# Patient Record
Sex: FEMALE | Race: BLACK OR AFRICAN AMERICAN | URBAN - METROPOLITAN AREA
[De-identification: names, ages, dates, MRNs, and addresses within clinical notes are randomized per-mention and may not be internally consistent; named-entity substitution may affect disease eponyms.]

---

## 2020-05-22 ENCOUNTER — HOSPITAL ENCOUNTER (OUTPATIENT)
Dept: MEDSURG UNIT | Facility: HOSPITAL | Age: 25
End: 2020-05-23
Attending: OBSTETRICS & GYNECOLOGY | Admitting: OBSTETRICS & GYNECOLOGY

## 2020-05-22 LAB
ABS NEUT (OLG): 5.05 X10(3)/MCL (ref 2.1–9.2)
ALBUMIN SERPL-MCNC: 3.1 GM/DL (ref 3.4–5)
ALBUMIN/GLOB SERPL: 0.7 RATIO (ref 1.1–2)
ALP SERPL-CCNC: 73 UNIT/L (ref 45–117)
ALT SERPL-CCNC: 20 UNIT/L (ref 12–78)
ANISOCYTOSIS BLD QL SMEAR: NORMAL
APPEARANCE, UA: ABNORMAL
APTT PPP: 26.6 SECOND(S) (ref 23.3–37)
AST SERPL-CCNC: 17 UNIT/L (ref 15–37)
BACTERIA #/AREA URNS AUTO: ABNORMAL /HPF
BASOPHILS # BLD AUTO: 0 X10(3)/MCL (ref 0–0.2)
BASOPHILS NFR BLD AUTO: 0 %
BILIRUB SERPL-MCNC: 0.1 MG/DL (ref 0.2–1)
BILIRUB UR QL STRIP: NEGATIVE
BILIRUBIN DIRECT+TOT PNL SERPL-MCNC: <0.1 MG/DL (ref 0–0.2)
BILIRUBIN DIRECT+TOT PNL SERPL-MCNC: ABNORMAL MG/DL
BUN SERPL-MCNC: 9 MG/DL (ref 7–18)
CALCIUM SERPL-MCNC: 8.5 MG/DL (ref 8.5–10.1)
CHLORIDE SERPL-SCNC: 109 MMOL/L (ref 98–107)
CO2 SERPL-SCNC: 26 MMOL/L (ref 21–32)
COLOR UR: ABNORMAL
CREAT SERPL-MCNC: 0.7 MG/DL (ref 0.6–1.3)
CROSSMATCH INTERPRETATION: NORMAL
CROSSMATCH INTERPRETATION: NORMAL
EOSINOPHIL # BLD AUTO: 0.1 X10(3)/MCL (ref 0–0.9)
EOSINOPHIL NFR BLD AUTO: 1 %
ERYTHROCYTE [DISTWIDTH] IN BLOOD BY AUTOMATED COUNT: 23.2 % (ref 11.5–14.5)
EST. AVERAGE GLUCOSE BLD GHB EST-MCNC: ABNORMAL MG/DL
GLOBULIN SER-MCNC: 4.6 GM/ML (ref 2.3–3.5)
GLUCOSE (UA): NEGATIVE
GLUCOSE SERPL-MCNC: 104 MG/DL (ref 74–106)
HBA1C MFR BLD: <3.5 % (ref 4.2–6.3)
HCT VFR BLD AUTO: 21.9 % (ref 35–46)
HGB BLD-MCNC: 6.2 GM/DL (ref 12–16)
HGB UR QL STRIP: 1 MG/DL
HYALINE CASTS #/AREA URNS LPF: ABNORMAL /LPF
HYPOCHROMIA BLD QL SMEAR: NORMAL
IMM GRANULOCYTES # BLD AUTO: 0.02 10*3/UL
IMM GRANULOCYTES NFR BLD AUTO: 0 %
INR PPP: 1.12 (ref 0.9–1.2)
KETONES UR QL STRIP: ABNORMAL
LEUKOCYTE ESTERASE UR QL STRIP: NEGATIVE
LYMPHOCYTES # BLD AUTO: 1.7 X10(3)/MCL (ref 0.6–4.6)
LYMPHOCYTES NFR BLD AUTO: 23 %
MCH RBC QN AUTO: 20.3 PG (ref 26–34)
MCHC RBC AUTO-ENTMCNC: 28.3 GM/DL (ref 31–37)
MCV RBC AUTO: 71.8 FL (ref 80–100)
MICROCYTES BLD QL SMEAR: NORMAL
MONOCYTES # BLD AUTO: 0.6 X10(3)/MCL (ref 0.1–1.3)
MONOCYTES NFR BLD AUTO: 8 %
NEUTROPHILS # BLD AUTO: 5.05 X10(3)/MCL (ref 2.1–9.2)
NEUTROPHILS NFR BLD AUTO: 68 %
NITRITE UR QL STRIP: NEGATIVE
PH UR STRIP: 5.5 [PH] (ref 4.5–8)
PLATELET # BLD AUTO: 533 X10(3)/MCL (ref 130–400)
PLATELET # BLD EST: NORMAL 10*3/UL
PMV BLD AUTO: 9.6 FL (ref 7.4–10.4)
POC BETA-HCG (QUAL): NEGATIVE
POIKILOCYTOSIS BLD QL SMEAR: NORMAL
POLYCHROMASIA BLD QL SMEAR: NORMAL
POTASSIUM SERPL-SCNC: 3.6 MMOL/L (ref 3.5–5.1)
PRODUCT READY: NORMAL
PRODUCT READY: NORMAL
PROLACTIN LEVEL (OHS): 29.7 NG/ML (ref 1–24)
PROT SERPL-MCNC: 7.7 GM/DL (ref 6.4–8.2)
PROT UR QL STRIP: 50 MG/DL
PROTHROMBIN TIME: 14 SECOND(S) (ref 11.9–14.4)
RBC # BLD AUTO: 3.05 X10(6)/MCL (ref 4–5.2)
RBC #/AREA URNS AUTO: >=100 /HPF
RBC MORPH BLD: NORMAL
SCHISTOCYTES BLD QL AUTO: NORMAL
SODIUM SERPL-SCNC: 139 MMOL/L (ref 136–145)
SP GR UR STRIP: 1.03 (ref 1–1.03)
SQUAMOUS #/AREA URNS LPF: ABNORMAL /LPF
T4 FREE SERPL-MCNC: 0.98 NG/DL (ref 0.76–1.46)
TRANSFUSION ORDER: NORMAL
TRANSFUSION ORDER: NORMAL
TSH SERPL-ACNC: 0.24 MIU/L (ref 0.36–3.74)
UROBILINOGEN UR STRIP-ACNC: NORMAL
WBC # SPEC AUTO: 7.5 X10(3)/MCL (ref 4.5–11)
WBC #/AREA URNS AUTO: ABNORMAL /HPF

## 2020-05-23 LAB
ABS NEUT (OLG): 6.17 X10(3)/MCL (ref 2.1–9.2)
ABS NEUT (OLG): 7.52 X10(3)/MCL (ref 2.1–9.2)
ABS NEUT (OLG): 7.82 X10(3)/MCL (ref 2.1–9.2)
BASOPHILS # BLD AUTO: 0 X10(3)/MCL (ref 0–0.2)
BASOPHILS # BLD AUTO: 0.1 X10(3)/MCL (ref 0–0.2)
BASOPHILS # BLD AUTO: 0.1 X10(3)/MCL (ref 0–0.2)
BASOPHILS NFR BLD AUTO: 0 %
BASOPHILS NFR BLD AUTO: 0 %
BASOPHILS NFR BLD AUTO: 1 %
EOSINOPHIL # BLD AUTO: 0 X10(3)/MCL (ref 0–0.9)
EOSINOPHIL # BLD AUTO: 0.1 X10(3)/MCL (ref 0–0.9)
EOSINOPHIL # BLD AUTO: 0.1 X10(3)/MCL (ref 0–0.9)
EOSINOPHIL NFR BLD AUTO: 0 %
EOSINOPHIL NFR BLD AUTO: 1 %
EOSINOPHIL NFR BLD AUTO: 1 %
ERYTHROCYTE [DISTWIDTH] IN BLOOD BY AUTOMATED COUNT: 20.7 % (ref 11.5–14.5)
ERYTHROCYTE [DISTWIDTH] IN BLOOD BY AUTOMATED COUNT: 21.2 % (ref 11.5–14.5)
ERYTHROCYTE [DISTWIDTH] IN BLOOD BY AUTOMATED COUNT: 21.4 % (ref 11.5–14.5)
HCT VFR BLD AUTO: 23.9 % (ref 35–46)
HCT VFR BLD AUTO: 25.5 % (ref 35–46)
HCT VFR BLD AUTO: 28.7 % (ref 35–46)
HGB BLD-MCNC: 7.1 GM/DL (ref 12–16)
HGB BLD-MCNC: 7.3 GM/DL (ref 12–16)
HGB BLD-MCNC: 8.8 GM/DL (ref 12–16)
IMM GRANULOCYTES # BLD AUTO: 0.02 10*3/UL
IMM GRANULOCYTES # BLD AUTO: 0.04 10*3/UL
IMM GRANULOCYTES # BLD AUTO: 0.04 10*3/UL
IMM GRANULOCYTES NFR BLD AUTO: 0 %
LYMPHOCYTES # BLD AUTO: 1.7 X10(3)/MCL (ref 0.6–4.6)
LYMPHOCYTES # BLD AUTO: 2.6 X10(3)/MCL (ref 0.6–4.6)
LYMPHOCYTES # BLD AUTO: 3.6 X10(3)/MCL (ref 0.6–4.6)
LYMPHOCYTES NFR BLD AUTO: 17 %
LYMPHOCYTES NFR BLD AUTO: 27 %
LYMPHOCYTES NFR BLD AUTO: 29 %
MCH RBC QN AUTO: 21.1 PG (ref 26–34)
MCH RBC QN AUTO: 21.6 PG (ref 26–34)
MCH RBC QN AUTO: 22.3 PG (ref 26–34)
MCHC RBC AUTO-ENTMCNC: 28.6 GM/DL (ref 31–37)
MCHC RBC AUTO-ENTMCNC: 29.7 GM/DL (ref 31–37)
MCHC RBC AUTO-ENTMCNC: 30.7 GM/DL (ref 31–37)
MCV RBC AUTO: 72.7 FL (ref 80–100)
MCV RBC AUTO: 72.9 FL (ref 80–100)
MCV RBC AUTO: 73.7 FL (ref 80–100)
MONOCYTES # BLD AUTO: 0.4 X10(3)/MCL (ref 0.1–1.3)
MONOCYTES # BLD AUTO: 0.6 X10(3)/MCL (ref 0.1–1.3)
MONOCYTES # BLD AUTO: 0.8 X10(3)/MCL (ref 0.1–1.3)
MONOCYTES NFR BLD AUTO: 4 %
MONOCYTES NFR BLD AUTO: 6 %
MONOCYTES NFR BLD AUTO: 7 %
NEUTROPHILS # BLD AUTO: 6.17 X10(3)/MCL (ref 2.1–9.2)
NEUTROPHILS # BLD AUTO: 7.52 X10(3)/MCL (ref 2.1–9.2)
NEUTROPHILS # BLD AUTO: 7.82 X10(3)/MCL (ref 2.1–9.2)
NEUTROPHILS NFR BLD AUTO: 63 %
NEUTROPHILS NFR BLD AUTO: 64 %
NEUTROPHILS NFR BLD AUTO: 77 %
PLATELET # BLD AUTO: 383 X10(3)/MCL (ref 130–400)
PLATELET # BLD AUTO: 421 X10(3)/MCL (ref 130–400)
PLATELET # BLD AUTO: 424 X10(3)/MCL (ref 130–400)
PMV BLD AUTO: 9.5 FL (ref 7.4–10.4)
PMV BLD AUTO: 9.6 FL (ref 7.4–10.4)
PMV BLD AUTO: 9.6 FL (ref 7.4–10.4)
RBC # BLD AUTO: 3.28 X10(6)/MCL (ref 4–5.2)
RBC # BLD AUTO: 3.46 X10(6)/MCL (ref 4–5.2)
RBC # BLD AUTO: 3.95 X10(6)/MCL (ref 4–5.2)
SARS-COV-2 RNA RESP QL NAA+PROBE: NOT DETECTED
WBC # SPEC AUTO: 12.5 X10(3)/MCL (ref 4.5–11)
WBC # SPEC AUTO: 9.6 X10(3)/MCL (ref 4.5–11)
WBC # SPEC AUTO: 9.7 X10(3)/MCL (ref 4.5–11)

## 2020-05-27 ENCOUNTER — HISTORICAL (OUTPATIENT)
Dept: ADMINISTRATIVE | Facility: HOSPITAL | Age: 25
End: 2020-05-27

## 2020-05-27 LAB
ABS NEUT (OLG): 5.86 X10(3)/MCL (ref 2.1–9.2)
ANISOCYTOSIS BLD QL SMEAR: NORMAL
BASOPHILS # BLD AUTO: 0 X10(3)/MCL (ref 0–0.2)
BASOPHILS NFR BLD AUTO: 0 %
DACRYOCYTES BLD QL SMEAR: NORMAL
EOSINOPHIL # BLD AUTO: 0.1 X10(3)/MCL (ref 0–0.9)
EOSINOPHIL NFR BLD AUTO: 1 %
ERYTHROCYTE [DISTWIDTH] IN BLOOD BY AUTOMATED COUNT: 22.7 % (ref 11.5–14.5)
HCT VFR BLD AUTO: 23.7 % (ref 35–46)
HGB BLD-MCNC: 6.8 GM/DL (ref 12–16)
HYPOCHROMIA BLD QL SMEAR: NORMAL
IMM GRANULOCYTES # BLD AUTO: 0.03 10*3/UL
IMM GRANULOCYTES NFR BLD AUTO: 0 %
LYMPHOCYTES # BLD AUTO: 2.1 X10(3)/MCL (ref 0.6–4.6)
LYMPHOCYTES NFR BLD AUTO: 24 %
MCH RBC QN AUTO: 22.2 PG (ref 26–34)
MCHC RBC AUTO-ENTMCNC: 28.7 GM/DL (ref 31–37)
MCV RBC AUTO: 77.5 FL (ref 80–100)
MICROCYTES BLD QL SMEAR: NORMAL
MONOCYTES # BLD AUTO: 0.4 X10(3)/MCL (ref 0.1–1.3)
MONOCYTES NFR BLD AUTO: 5 %
NEUTROPHILS # BLD AUTO: 5.86 X10(3)/MCL (ref 2.1–9.2)
NEUTROPHILS NFR BLD AUTO: 68 %
OVALOCYTES BLD QL SMEAR: NORMAL
PLATELET # BLD AUTO: 398 X10(3)/MCL (ref 130–400)
PLATELET # BLD EST: ADEQUATE 10*3/UL
PMV BLD AUTO: 9.6 FL (ref 7.4–10.4)
POIKILOCYTOSIS BLD QL SMEAR: NORMAL
POLYCHROMASIA BLD QL SMEAR: NORMAL
PROLACTIN LEVEL (OHS): 18.3 NG/ML (ref 1–24)
RBC # BLD AUTO: 3.06 X10(6)/MCL (ref 4–5.2)
RBC MORPH BLD: NORMAL
SCHISTOCYTES BLD QL AUTO: NORMAL
T3FREE SERPL-MCNC: 2.48 PG/ML (ref 2.18–3.98)
T4 FREE SERPL-MCNC: 1.07 NG/DL (ref 0.76–1.46)
TSH SERPL-ACNC: 0.78 MIU/L (ref 0.36–3.74)
WBC # SPEC AUTO: 8.5 X10(3)/MCL (ref 4.5–11)

## 2022-04-07 ENCOUNTER — HISTORICAL (OUTPATIENT)
Dept: ADMINISTRATIVE | Facility: HOSPITAL | Age: 27
End: 2022-04-07

## 2022-04-23 VITALS
SYSTOLIC BLOOD PRESSURE: 125 MMHG | HEIGHT: 65 IN | WEIGHT: 213.19 LBS | DIASTOLIC BLOOD PRESSURE: 73 MMHG | OXYGEN SATURATION: 100 % | BODY MASS INDEX: 35.52 KG/M2

## 2022-04-28 NOTE — ED PROVIDER NOTES
"   Patient:   Arlette Saini             MRN: [id number]            FIN: [id number]               Age:   24 years     Sex:  Female     :  1995   Associated Diagnoses:   Vaginal bleeding; Symptomatic anemia   Author:   Aime Key      Basic Information   Time seen: Immediately upon arrival.   History source: Patient.   Arrival mode: Private vehicle, walking.   History limitation: None.   Additional information: Chief Complaint from Nursing Triage Note : Chief Complaint   2020 13:38 CDT      Chief Complaint           Hx of fibroids finished menstrual cycle 10 days ago having heavy bleeding w/large clots, w/SOB. Required blood transfusion 2 months ago. Last week had blood work and CBC showed Hgb 6 states pt  .      History of Present Illness   The onset was: The course/duration of symptoms is: There was Location:.  The character of symptoms is: The degree of symptoms is: Risk factors consist of.  Prior episodes:.  Therapy today:.  Associated symptoms:.  Additional history:.     The patient presents with vaginal bleeding and hx of heavy menstrual cycles and anemia, required transfusion 2020, LMP 5/3-10, was seen by pcp last week with routine labwork, pcp called her with Hgb of 6 and advised her to f/u in ER, she reports she has been too busy, started heavy vaginal bleeding yesterday with clots and decided to f/u in ER today, mild pelvic cramping, reports SPICER, never pregnant.  The onset was 1  days ago.  The course/duration of symptoms is constant.  Location: pelvis. Radiating pain: none. The character of symptoms is cramping.  The degree of symptoms is "heavy bleeding", passing clots.  Risk factors consist of history of anemia and hx of menorrhagia.  Prior episodes: frequent.  Therapy today: none.  Associated symptoms: none.  referred to [facility] by [facility], telemedicine visit on .        Review of Systems   Constitutional symptoms:  Negative except as documented in HPI, " no fever, no chills.    Skin symptoms:  Negative except as documented in HPI.   Eye symptoms:  Negative except as documented in HPI.   ENMT symptoms:  Negative except as documented in HPI.   Respiratory symptoms:  Negative except as documented in HPI.   Cardiovascular symptoms:  Negative except as documented in HPI, No chest pain,    Gastrointestinal symptoms:  Negative except as documented in HPI, no abdominal pain, no nausea, no vomiting, no diarrhea, no constipation.    Genitourinary symptoms:  Vaginal bleeding, no dysuria, no hematuria, no vaginal discharge.    Musculoskeletal symptoms:  Negative except as documented in HPI.   Neurologic symptoms:  Negative except as documented in HPI.   Psychiatric symptoms:  Negative except as documented in HPI.   Endocrine symptoms:  Negative except as documented in HPI.   Hematologic/Lymphatic symptoms:  Negative except as documented in HPI.   Allergy/immunologic symptoms:  Negative except as documented in HPI.             Additional review of systems information: All other systems reviewed and otherwise negative.      Health Status   Allergies:    Allergic Reactions (All)  Severity Not Documented  Iodine- Hives.,    Allergies (1) Active Reaction  iodine Hives  .   Medications:  (Selected)   Documented Medications  Documented  Microgestin 1/20: 1 tab(s), Oral, Daily, 0 Refill(s)  ferrous fumarate 324 mg oral tablet: 324 mg = 1 tab(s), Oral, BID, # 30 tab(s), 0 Refill(s).   Menstrual history: Last menstrual period: Date 5/3/2020.      Past Medical/ Family/ Social History   Medical history:    No active or resolved past medical history items have been selected or recorded., Reviewed as documented in chart.   Surgical history:    No active procedure history items have been selected or recorded..   Family history: Not significant.   Social history: Reviewed as documented in chart.   Problem list:    Active Problems (3)  Abnormal uterine bleeding (AUB)   Iron deficiency anemia    Leiomyoma   .      Physical Examination               Vital Signs   Vital Signs   5/22/2020 15:07 CDT      Peripheral Pulse Rate     98 bpm                             Respiratory Rate          26 br/min  HI                             SpO2                      100 %                             Oxygen Therapy            Room air    5/22/2020 13:38 CDT      Temperature Oral          36.2 DegC                             Temperature Oral (calculated)             97.16 DegF                             Peripheral Pulse Rate     115 bpm  HI                             Respiratory Rate          17 br/min                             SpO2                      100 %                             Oxygen Therapy            Room air                             Systolic Blood Pressure   137 mmHg                             Diastolic Blood Pressure  75 mmHg  .   Measurements   5/22/2020 13:38 CDT      Weight Dosing             98.4 kg                             Weight Measured           98.4 kg                             Weight Measured and Calculated in Lbs     216.93 lb  .   Oxygen saturation.   General:  Alert, no acute distress.    Skin:  Warm, dry.    Head:  Normocephalic.   Neck:  Supple, trachea midline, no tenderness.    Eye:  Normal conjunctiva.   Cardiovascular:  Regular rate and rhythm, No murmur, Normal peripheral perfusion, No edema.    Respiratory:  Lungs are clear to auscultation, respirations are non-labored, breath sounds are equal, Symmetrical chest wall expansion.    Gastrointestinal:  Soft, Nontender, Non distended, Normal bowel sounds.    Genitourinary:  No tenderness, normal external genitalia, no lesions, External genitalia: Normal, Speculum exam: Vaginal, mild, bleeding, blood clots, Bimanual exam: Adnexa, ovary negative, no tenderness, no mass, no cervix motion tenderness.    Back:  Nontender, Normal range of motion.    Musculoskeletal:  Normal ROM, normal strength, no tenderness, no swelling.     Neurological:  Alert and oriented to person, place, time, and situation, No focal neurological deficit observed.    Lymphatics:  No lymphadenopathy.   Psychiatric:  Cooperative, appropriate mood & affect.       Medical Decision Making   Documents reviewed:  Emergency department nurses' notes.   Orders  Launch Order Profile (Selected)   Inpatient Orders  Ordered  Transfusion Order RBC's: 05/22/20 15:19:00 CDT, Now collect, Blood, Lab Collect, Packed RBC, To Give, 2, 05/22/20, 05/22/20 15:19:00 CDT  Type and Screen for Transfusion [facility]: 05/22/20 14:58:00 CDT, Stat collect, Blood, Lab Collect, Packed RBC, To Give, 3, 05/22/20, 05/22/20 14:58:00 CDT  [facility] ED Consult to GYN: 05/22/20 15:13:00 CDT, vaginal bleeding, symptomatic anemia  Urine Pregnancy Test POC: 05/22/20 13:52:00 CDT, Once-NOW, Stop date 05/22/20 13:52:00 CDT, 05/22/20 13:52:00 CDT  Ordered (Exam Ordered)  US Ovaries w/ Doppler: Stat, 05/22/20 15:10:00 CDT, Pelvic Pain, None, Wheelchair, Rad Type, Schedule this test, 05/22/20 15:10:00 CDT  US Pelvic Non-OB w Transvag if needed: Stat, 05/22/20 15:10:00 CDT, Other (please specify), vaginal bleeding, None, Wheelchair, Rad Type, Schedule this test, 05/22/20 15:10:00 CDT  US Transvaginal Non-OB: Stat, 05/22/20 15:09:00 CDT, Vaginal Bleeding, None, Wheelchair, Rad Type, Schedule this test, 05/22/20 15:09:00 CDT  Completed  Automated Diff: STAT collect, 05/22/20 14:15:00 CDT, Blood, Collected, Once, Stop date 05/22/20 14:15:00 CDT, Lab Collect, 05/22/20 13:52:00 CDT  Blood Smear Microscopic Exam: STAT collect, 05/22/20 14:15:00 CDT, Blood, Collected, Once, Stop date 05/22/20 14:15:00 CDT, Lab Collect, 05/22/20 13:52:00 CDT  CBC w/ Auto Diff: STAT collect, 05/22/20 14:15:37 CDT, BLOOD, Collected, Stop date 05/22/20 14:15:00 CDT, Lab Collect  CMP: STAT collect, 05/22/20 14:15:37 CDT, BLOOD, Collected, Stop date 05/22/20 14:15:00 CDT, Lab Collect  Estimated Glomerular Filtration Rate: STAT collect, 05/22/20 14:15:00  CDT, Blood, Collected, Stop date 05/22/20 14:15:00 CDT, Lab Collect, 05/22/20 13:52:00 CDT  Urinalysis Complete [facility]HC: Stat collect, Urine, 05/22/20 13:52:00 CDT, Stop date 05/22/20 13:52:00 CDT, Nurse collect  Urinalysis Microscopic [facility]: Stat collect, Urine, Collected, 05/22/20 13:33:00 CDT, Stop date 05/22/20 13:33:00 CDT, Nurse collect  .   Results review:  Lab results : Lab View   5/22/2020 14:47 CDT      U beta hCG Ql POC         Negative    5/22/2020 14:15 CDT      Sodium Lvl                139 mmol/L                             Potassium Lvl             3.6 mmol/L                             Chloride                  109 mmol/L  HI                             CO2                       26 mmol/L                             Calcium Lvl               8.5 mg/dL                             Glucose Lvl               104 mg/dL                             BUN                       9 mg/dL                             Creatinine                0.70 mg/dL                             eGFR-AA                   >105 mL/min                             eGFR-FAINA                  >105 mL/min                             Bili Total                0.1 mg/dL  LOW                             Bili Direct               <0.1 mg/dL                             Bili Indirect             Unable to calc. mg/dL                             AST                       17 unit/L                             ALT                       20 unit/L                             Alk Phos                  73 unit/L                             Total Protein             7.7 gm/dL                             Albumin Lvl               3.1 gm/dL  LOW                             Globulin                  4.60 gm/mL  HI                             A/G Ratio                 0.7 ratio  LOW                             WBC                       7.5 x10(3)/mcL                             RBC                       3.05 x10(6)/mcL  LOW                             Hgb                        6.2 gm/dL  CRIT                             Hct                       21.9 %  CRIT                             Platelet                  533 x10(3)/mcL  HI                             MCV                       71.8 fL  LOW                             MCH                       20.3 pg  LOW                             MCHC                      28.3 gm/dL  LOW                             RDW                       23.2 %  HI                             MPV                       9.6 fL                             Abs Neut                  5.05 x10(3)/mcL                             Neutro Auto               68 %  NA                             Lymph Auto                23 %  NA                             Mono Auto                 8 %  NA                             Eos Auto                  1 %  NA                             Abs Eos                   0.1 x10(3)/mcL                             Basophil Auto             0 %  NA                             Abs Neutro                5.05 x10(3)/mcL                             Abs Lymph                 1.7 x10(3)/mcL                             Abs Mono                  0.6 x10(3)/mcL                             Abs Baso                  0.0 x10(3)/mcL                             IG%                       0 %  NA                             IG#                       0.0200  NA                             Hypochrom                 2+                             Platelet Est              Increased                             Anisocyte                 1+                             Poik                      1+                             Microcyte                 1+                             Polychrom                 1+                             RBC Morph                 Abnormal                             Schistocyte               1+                             Stippled RBC              1+    5/22/2020 13:33 CDT      UA Appear                 Cloudy                              UA Color                  LIGHT ORANGE                             UA Spec Grav              1.035  HI                             UA Bili                   Negative                             UA pH                     5.5                             UA Urobilinogen           Normal                             UA Blood                  1.0 mg/dL                             UA Glucose                Negative                             UA Ketones                Trace                             UA Protein                50 mg/dL                             UA Nitrite                Negative                             UA Leuk Est               Negative                             UA WBC Interp             0-2 /HPF                             UA RBC Interp             >=100 /HPF                             UA Bact Interp            None Seen /HPF                             UA Squam Epi Interp        /LPF                             UA Hyal Cast Interp       3-5 /LPF    , Interpretation Abnormal results  H/H 21.9/6.2.       Procedure   Critical care note   Total time: 30 minutes spent engaged in work directly related to patient care and/ or available for direct patient care.   Critical condition(s) addressed for impending deterioration include: cardiovascular.   Associated risk factors: hypotension, shock, hypoxia, bleeding.   Management: bedside assessment, supervision of care, Interpretation blood pressure, Interventions hemodynamic management.   Performed by: self.      Impression and Plan   Diagnosis   Vaginal bleeding (KTW66-ER N93.9)   Symptomatic anemia (YEF28-VL D64.9)      Calls-Consults   -  consulted Dr Lozoya (ER staff) who advised to consult GYN for admission, consulted Dr Mckeon (GYN) who requested pelvic u/s and transfuse 2 units PRBC.   Plan   Condition: Stable.    Disposition: Admit time  5/22/2020 15:13:00, Admit to Inpatient Unit.       Addendum       Teaching-Supervisory Addendum-Brief   I participated in the following activities of this patients care: the medical history, the physical exam, medical decision making.   The case was discussed with: the nurse practitioner.   Evaluation and management service: I agree with the evaluation and management decisions made in this patient's care.   Results interpretation: I agree with the study interpretation in this patient's care.   Notes: I have seen this patient and performed an independent history and physical examination, and I agree with all of violation and management as documented by Sean Rizo NP.  Ongoing heavy menses with known anemia, becoming more symptomatic, now presents with ongoing vaginal leading and symptomatic anemia with hemoglobin less than 7.  Of note, transient SVT seen on monitor while standing up bending forward in the ER, spontaneously resolved.  If consulted gynecology and they are seeing for transfusion and further recommendations to control dysfunctional heavy uterine bleeding with significant blood loss anemia..

## 2022-05-01 NOTE — HISTORICAL [facility] CERNER
This is a historical note converted from [employer]. Formatting and pictures may have been removed.  Please reference [employer] for original formatting and attached multimedia. Chief Complaint  Hx of fibroids finished menstrual cycle 10 days ago having heavy bleeding w/large clots, w/SOB. Required blood transfusion 2 months ago. Last week had blood work and CBC showed Hgb 6 states pt  History of Present Illness  Ms. Arlette Saini is a 25yo G0?who presents to [facility] ED for vaginal bleeding.  ?   She reports she has been having heavy bleeding for several years with heavy flow that lasts for up to 2 weeks/month. She was started on Microgestin in 3/2020, however stopped it two weeks ago since she continued to have heavy bleeding on her last two cycles, although they did last only 7 days. She started bleeding again two days ago, LMP 5/20/2020, and has passed clots and soiled her clothes. She reports shortness of breath with moving and weakness/fatigue so she came to the ED. Denies any dysuria, urinary retention,?hematuria. No diarrhea, constipation.  ?  Reports she was diagnosed with?fibroids 2 years ago. US confirmed this at [facility] in?February 2020 when she needed transfusion. This was the only transfusion she has ever had. Has never had been told?she was anemic before this year.?Does not bleed/bruise easily, bleed with dental procedures, or have prolonged epistaxis.?She has previously followed at [facility] Dr. Benítez for AUB-L/ANAYELI. She was recently referred to [facility] gynecology clinic for these diagnoses has had one telemedicine visit in April 2020.  ?   She reports she is currently undergoing workup for hyperthyroidism. States she just had a thyroid US at [facility], but does not know results.  ?   ObGynHx:  G0  Menarche age 13  Menses q30 days, last 1-2 weeks, heavy flow with clots and soiling clothes  Last pap 3/2020 - NILM, denies h/o abnl pap  STI Hx: CT/trichomonas 2019, s/p  tx  Contraception: condoms  Lifetime partners: 4, male only  ?   PMH: Anemia, self-reported hyperthyroidsm  PSH: none  Meds: iron BID,? Microgestin  Allergies: NKDA  SHx: denies tobacco use, social drinking, no drug use. Financial   ?  Review of Systems  Constitutional: No fever, No chills.  Neuro: No headaches, vision changes, focal neurologic deficit  Respiratory: SOB as above. Denies cough, rhinorrhea, recent illness  Cardiovascular:? Fatigue as above. No chest pain, no syncope.  Gastrointestinal: No nausea, No vomiting, No diarrhea, No constipation.  Genitourinary: No dysuria, No hematuria, No frequency as above.  Skin: No rashes or lesions  Endocrine: No hot/cold intolerance, no palpitations, no increased thirst  Physical Exam  Vitals & Measurements  T:?36.2? ?C (Oral)? HR:?98(Peripheral)? RR:?26? BP:?137/75? SpO2:?100%? WT:?98.4?kg? LMP:?05/11/2020 00:00 CDT?  General: NAD, A/Ox3  Neck: supple, mild symmetric thyromegaly, no lymphadenopathy palpated  Respiratory: CTAB, no wheezes, rales, or rhonchi  Cardiovascular: RRR no murmurs, rubs, or gallops. HR 110bpm  Abdomen: soft, nondistended, nontender to palpation, no rebound or guarding  Extremities: no edema, no calf tenderness  ?   External genitalia: Normal female anatomy, no masses/lesions. Normal appearing urethral meatus. Normal appearing external anus. No lymphadenopathy.  Sterile speculum exam: vaginal mucosa normal in appearance. Pink. No masses/lesions, no discharge, no pooling of blood in vault, although there are?five 2cm clots noted on peripad that patient had just changed.?Cervix well visualized, smooth in contour no masses or lesions. Os normal in appearance, small trickle of blood from cervical os on Valsalva.  Bimanual exam: Vagina with?good capacity. Uterus?10cm in size with anterior lower uterine segment fibroid?although exam limited due to body habitus.? Mobile, nontender.?  ?No cervical motion tenderness. No adnexal  fullness/tenderness. Urethra and Bladder?nontender.  Assessment/Plan  Ms. Saini is a 25yo G0 with abnormal uterine bleeding, admitted to observation for blood transfusion for symptomatic anemia.  ?  1. Symptomatic anemia: secondary to AUB.  ??? - Patient with SOB,?fatigue, and tachycardia.  ??? - Patient with no prior history of bleeding/blood disorders. Has had one transfusion in Feb 2020. Low suspicion for coagulopathy.  ??? - CBC as above. Coags prior to transfusion are normal, no evidence of coagulopathy. Transfuse 2U PRBCs now. CBC ordered for tomorrow am.  ??? - History of iron deficiency anemia noted in [facility] records, she has been compliant with PO iron outpatient.  ??? - Strict I/Os overnight, ambulate with assistance.  ?  2. AUB  ??? - Patient with known history of fibroids, workup for hyperthyroidism. At this time, etiology of her AUB unclear. May be due to ovulatory dysfunction, endocrine dysfunction, or fibroids.  ??? - TVUS ordered, results pending.  ??? - Stop?Microgestin now. Started on Provera?taper: 20mg TID for seven days, will taper outpatient after being seen in clinic.?Discussed likely a good candidate for Mirena IUD placement in office.  ??? - TSH, free T4, prolactin, A1c ordered.?  ??? - Strict pad counts, nursing communication placed  ??? - ibuprofen PRN for cramping   Problem List/Past Medical History  Ongoing  Abnormal uterine bleeding (AUB)  Iron deficiency anemia  Leiomyoma  Historical  No qualifying data  Medications  Inpatient  Ibuprofen 600 mg tablet, 600 mg= 1 tab(s), Oral, q6hr  Ondansetron ODT (oral disintegrating tab), 4 mg= 1 tab(s), Oral, q6hr, PRN  Provera, 60 mg= 6 tab(s), Oral, TID  Home  ferrous fumarate 324 mg oral tablet, 324 mg= 1 tab(s), Oral, BID  Microgestin 1/20, 1 tab(s), Oral, Daily,? ?Not taking  Allergies  iodine?(Hives)  Social History  Abuse/Neglect  No, 05/22/2020  Alcohol  Current, 1-2 times per month, 05/22/2020  Substance Use  Never,  05/22/2020  Tobacco  Never (less than 100 in lifetime), N/A, 05/22/2020  Family History  Breast cancer: MGM, mat aunt in 40s, mat?cousin @ 26 (mastectomy)  No ovarian, uterine, or colon cancer.  Lab Results  Test Name Test Result Date/Time Comments   PT 14.0 second(s) 05/22/2020 15:20 CDT    INR 1.12 05/22/2020 15:20 CDT    PTT 26.6 second(s) 05/22/2020 15:20 CDT    WBC 7.5 x10(3)/mcL 05/22/2020 14:15 CDT    Hgb 6.2 gm/dL (Critical) 05/22/2020 14:15 CDT Critical result called to Gail Carrasco, ED on 5/22/2020 14:33:20 CDT _ and verified by verbal readback. MPG   Hct 21.9 % (Critical) 05/22/2020 14:15 CDT Critical result called to Gail Carrasco, ED on 5/22/2020 14:33:20 CDT _ and verified by verbal readback. MPG   Platelet 533 x10(3)/mcL (High) 05/22/2020 14:15 CDT    Diagnostic Results  TVUS pending.      aware/agree with above-lhillMD

## 2022-05-01 NOTE — HISTORICAL [facility] CERNER
This is a historical note converted from Blanche. Formatting and pictures may have been removed.  Please reference Blanche for original formatting and attached multimedia. Chief Complaint  Hospital discharge symptomatic anemia and AUB-L feels as provera is not helping as much  History of Present Illness  24G0 presents for follow up of weekend admission. She has a long history of AUB-L requiring now 2 transfusions, most recently 4u PRBC over the weekend. She was started on provera taper and is now on day 4 of 20 mg BID. She reports it initally helped, but she has since started bleeding heavily again. Denies s/s of anemia.  ?  ?  Review of Systems  Constitutional: No fever, No chills.  Respiratory: No shortness of breath.  Cardiovascular: No chest pain, No syncope.  Gastrointestinal: No nausea, No vomiting, No diarrhea, No constipation.  Genitourinary: No dysuria, No hematuria, No abnormal discharge or bleeding.  Neurologic: Alert and oriented X4  ?  Physical Exam  Vitals & Measurements  T:?37.2? ?C (Oral)? HR:?87(Peripheral)? RR:?20? BP:?139/93?  HT:?165?cm? WT:?100.5?kg? BMI:?36.91? LMP:?05/21/2020 00:00 CDT?  General Appearance: Alert, cooperative, no distress,  Extremities: Extremities normal, atraumatic, no cyanosis or edema  Skin: Skin turgor normal, no rashes or lesions.  Assessment/Plan  1.?Uterine fibroid?D25.9  US reviewed with prominent fibroids noted. She will need myomectomy. MRI ordered for surgical plannning  Ordered:  CBC w/ Auto Diff, Routine collect, 05/27/20 10:42:00 CDT, Blood, Stop date 05/27/20 10:42:00 CDT, Lab Collect, Uterine fibroid, 05/27/20 10:42:00 CDT  MRI Pelvis W Contrast, Routine, 05/27/20 10:14:00 CDT, Other (please specify), Uterine fibroids, symptomatic, None, Ambulatory, Rad Type, Order for future visit, Uterine fibroid  Menorrhagia, Schedule this test, [facility], 05/27/20 10:14:00 CDT  Prolactin, Routine collect, 05/27/20 10:42:00 CDT, Blood, Stop date  05/27/20 10:42:00 CDT, Lab Collect, Uterine fibroid  Menorrhagia  Hyperprolactinemia, 05/27/20 10:42:00 CDT  Thyroid Stimulating Hormone, Routine collect, 05/27/20 10:42:00 CDT, Blood, Stop date 05/27/20 10:42:00 CDT, Lab Collect, Uterine fibroid  Menorrhagia  Hyperprolactinemia, 05/27/20 10:42:00 CDT  ?  2.?Menorrhagia?N92.0  lab abnormalities noted including subclinical hypothyroidism and hyperprolactinemia. levels repeated today. Given history of visual loss 2 years ago along with elevated prolactin, MRI pituitary ordered to evaluate for prolactinoma  Ordered:  MRI Pelvis W Contrast, Routine, 05/27/20 10:14:00 CDT, Other (please specify), Uterine fibroids, symptomatic, None, Ambulatory, Rad Type, Order for future visit, Uterine fibroid  Menorrhagia, Schedule this test, [facility], 05/27/20 10:14:00 CDT  Prolactin, Routine collect, 05/27/20 10:42:00 CDT, Blood, Stop date 05/27/20 10:42:00 CDT, Lab Collect, Uterine fibroid  Menorrhagia  Hyperprolactinemia, 05/27/20 10:42:00 CDT  Thyroid Stimulating Hormone, Routine collect, 05/27/20 10:42:00 CDT, Blood, Stop date 05/27/20 10:42:00 CDT, Lab Collect, Uterine fibroid  Menorrhagia  Hyperprolactinemia, 05/27/20 10:42:00 CDT  ?  Orders:  Free T4, Routine collect, 05/28/20 5:00:00 CDT, Blood, Stop date 05/28/20 5:00:00 CDT, Lab Collect, Subclinical hypothyroidism, 05/28/20 5:00:00 CDT  MRI Brain Pituitary W W/O, Routine, 05/27/20 10:15:00 CDT, Other (please specify), None, Ambulatory, elevated prolactin with menstrual abnormalities and vision loss, Rad Type, Order for future visit, Hyperprolactinemia  Visual loss, one eye, Schedule this test, [facility]..  T3 Free, Routine collect, 05/27/20 10:42:00 CDT, Blood, Stop date 05/27/20 10:42:00 CDT, Lab Collect, Subclinical hypothyroidism, 05/27/20 10:42:00 CDT   Problem List/Past Medical History  Ongoing  Abnormal uterine bleeding (AUB)  Dysmenorrhea  Iron deficiency  anemia  Leiomyoma  Obesity  Historical  No qualifying data  Procedure/Surgical History  Transfusion of Nonautologous Red Blood Cells into Peripheral Vein, Percutaneous Approach (05/22/2020)  Transfusion, blood or blood components (05/22/2020)  denies   Medications  ferrous sulfate 325 mg (65 mg elemental iron) oral delayed release tablet, 325 mg= 1 tab(s), Oral, BID  ferrous sulfate 325 mg (65 mg elemental iron) oral tablet, 325 mg= 1 tab(s), Oral, BID  ibuprofen 600 mg oral tablet, 600 mg= 1 tab(s), Oral, q6hr, PRN  Provera 10 mg oral tablet, 20 mg= 2 tab(s), Oral, Daily  senna 8.6 mg oral tablet, 8.6 mg= 1 tab(s), Oral, BID, PRN  Allergies  iodine?(Hives)  Social History  Abuse/Neglect  No, 05/22/2020  Alcohol  Current, 1-2 times per month, 05/22/2020  Substance Use  Never, 05/22/2020  Tobacco  Never (less than 100 in lifetime), N/A, 05/27/2020  Family History  Breast ca.: Aunt and Maternal Grandmother.  Health Maintenance  Health Maintenance  ???Pending?(in the next year)  ??? ??OverDue  ??? ? ? ?Diabetes Screening due??and every?  ??? ? ? ?Alcohol Misuse Screening due??01/01/20??and every 1??year(s)  ??? ??Due?  ??? ? ? ?ADL Screening due??05/27/20??and every 1??year(s)  ??? ? ? ?Cervical Cancer Screening due??05/27/20??and every?  ??? ? ? ?Tetanus Vaccine due??05/27/20??and every 10??year(s)  ??? ??Due In Future?  ??? ? ? ?Obesity Screening not due until??01/01/21??and every 1??year(s)  ???Satisfied?(in the past 1 year)  ??? ??Satisfied?  ??? ? ? ?Blood Pressure Screening on??05/27/20.??Satisfied by Katherine Hollingsworth LPN  ??? ? ? ?Body Mass Index Check on??05/27/20.??Satisfied by Katherine Hollingsworth LPN  ??? ? ? ?Depression Screening on??05/27/20.??Satisfied by Katherine Hollingsworth LPN  ??? ? ? ?Diabetes Screening on??05/22/20.??Satisfied by Karen Tovar  ??? ? ? ?Obesity Screening on??05/27/20.??Satisfied by Katherine Hollingsworth LPN  ?      Reviewed the patients medical history, residents  findings on physical exam, and the diagnosis with treatment plan. Care provided was reasonable and necessary.  See H&P

## 2022-05-01 NOTE — HISTORICAL [facility] CERNER
This is a historical note converted from Blanche. Formatting and pictures may have been removed.  Please reference Blanche for original formatting and attached multimedia. Admit and Discharge Dates  Admit Date: 05/22/2020  Discharge Date: 05/23/2020  Physicians  Attending Physician - Vega MCGEE, Abraham TROTTER  Admitting Physician - Vega MCGEE, Abraham TROTTER  Primary Care Physician - No PCP, No  Discharge Diagnosis  1.?Symptomatic anemia?D64.9  2.?Abnormal uterine bleeding (AUB)?N93.9  3.?Thrombocytosis?D47.3  4.?Dysmenorrhea?N94.6  Hospital Course  Ms Saini is a 25yo G0 with known AUB-L?who presented for 2 days of?heavy vaginal bleeding, weakness,?and shortness of breath. Notably, patient reports her periods had always been heavy, but had worsened in the past few years due to uterine fibroids.  ?   She was noted to have moderate vaginal bleeding on presentation to the ED, with tachycardia and CBC results as below.?Her?thrombocytosis and anemia were secondary to active bleeding. She was admitted to observation for transfusion and control of vaginal bleeding. This patient was?in the process of transferring Gyn care?from [place]. She had been?seen by telemedicine once in [facility] clinic with plans for myomectomy due to her desires for future fertility. She had been taking Microgestin with no improvement in her cycles and stopped it two weeks prior to this episode of vaginal bleeding.  ?   She was started on Provera which slowed her bleeding significantly.?In early AM of?HD #2, she had only used one pad overnight and was feeling much better. Still had menstrual cramping improved with ibuprofen. She was transfused 2 units of packed red blood cells, her post-transfusion CBC after?2U?is as below, H/H 7.3/25.2.?  ?   On mid-morning of?HD#2, she had an episode of menstrual cramping with passage of 800mL blood clots into top hat over about 10minutes, had stopped heavy bleeding upon MD exam. Given that stat CBC at that time was essentially  unchanged from 0500 post-transfusion CBC, minimal response to transfusion, and scant bleeding overnight, it was possible that the patient had had intrauterine bleeding overnight that she passed once she started cramping. She was given a dose of Methergine and Provera, with bleeding decreasing to scant over approximately one hour.?IVF bolus was started and she was? then transfused another 2U PRBCs, with a dose of lasix inbetween units 3 and 4. She was initially nauseas during this episode of bleeding, but resolved with zofran. She was not tachycardic and was diuresing well over the course of the day.  ?   She improved markedly over the course of the day with minimal vaginal bleeding and no cramping. She denied shortness of breath, weakness, dizziness, chest pain, palpitations. Had been ambulating around her room and feeling well. She had received a total of 4U PRBCs.?She was determined to be meeting all goals for discharge. She was given strict returns precautions for vaginal bleeding and symptoms of anemia, made aware of her H/H. Discussed importance of adherence to iron regimen and?that she may need further transfusion and/or MRI prior to surgical planning. She acknowledged her agreement and understanding to the plan. Questions answered.?  Significant Findings  ?Test Name ?Test Result ?Date/Time ?Comments   Creatinine 0.70 mg/dL 05/22/2020 14:15 CDT    Hgb A1c <3.5 % (Low) 05/22/2020 15:20 CDT    Prolactin 29.7 ng/mL (High) 05/22/2020 15:20 CDT    T4 Free 0.98 ng/dL 05/22/2020 15:20 CDT    TSH 0.237 mIU/L (Low) 05/22/2020 15:20 CDT    WBC 9.6 x10(3)/mcL 05/23/2020 05:19 CDT    WBC 7.5 x10(3)/mcL 05/22/2020 14:15 CDT    Hgb 7.3 gm/dL (Low) 05/23/2020 05:19 CDT    Hgb 6.2 gm/dL (Critical) 05/22/2020 14:15 CDT Critical result called to [facility] on 5/22/2020 14:33:20 CDT _ and verified by verbal readback. MPG   Hct 25.5 % (Low) 05/23/2020 05:19 CDT    Hct 21.9 % (Critical) 05/22/2020 14:15 CDT Critical  result called to [facility], ED on 5/22/2020 14:33:20 CDT _ and verified by verbal readback. MPG   Platelet 421 x10(3)/mcL (High) 05/23/2020 05:19 CDT    Platelet 533 x10(3)/mcL (High) 05/22/2020 14:15 CDT    ?  Post-Transfusion of 4U PRBCs:  ??Test Name ?Test Result ?Date/Time   WBC 12.5 x10(3)/mcL (High) 05/23/2020 21:05 CDT   RBC 3.95 x10(6)/mcL (Low) 05/23/2020 21:05 CDT   Hgb 8.8 gm/dL (Low) 05/23/2020 21:05 CDT   Hct 28.7 % (Low) 05/23/2020 21:05 CDT   Platelet 383 x10(3)/mcL 05/23/2020 21:05 CDT   Objective  Vitals & Measurements  T:?37.0? ?C (Oral)? TMIN:?36.7? ?C (Oral)? TMAX:?37.4? ?C (Oral)? HR:?86(Monitored)? RR:?16? BP:?120/73? SpO2:?98%?  Physical Exam  General: NAD, A/Ox3  Neck: supple, mild symmetric thyromegaly, no lymphadenopathy palpated  Respiratory: CTAB, no wheezes, rales, or rhonchi.  Cardiovascular: RRR no murmurs, rubs, or gallops.  Abdomen: soft, nondistended, nontender to palpation, no rebound or guarding  Extremities: no edema, no calf tenderness  : No vaginal bleeding on peripad  Patient Discharge Condition  Stable  Discharge Disposition  To home with strict return precautions.  Discussed patient and plan with Dr. Ferrari   Discharge Medication Reconciliation  Continue  ferrous sulfate (ferrous sulfate 325 mg (65 mg elemental iron) oral delayed release tablet)?325 mg, Oral, BID  ibuprofen (ibuprofen 600 mg oral tablet)?600 mg, Oral, q6hr, PRN as needed for menstrual pain  medroxyPROGESTERone (Provera 10 mg oral tablet)?20 mg, Oral, Daily  senna (senna 8.6 mg oral tablet)?8.6 mg, Oral, BID, PRN for constipation  Discontinue  ethinyl estradiol-norethindrone (Microgestin 1/20)?1 tab(s), Oral, Daily  ferrous fumarate (ferrous fumarate 324 mg oral tablet)?324 mg, Oral, BID  Education and Orders Provided  Blood Transfusion, Adult, Care After  Abnormal Uterine Bleeding, Easy-to-Read  Anemia  Discharge - 05/23/20 7:44:00 CDT, Home?  Discharge Activity - Activity as Tolerated, Pelvic rest,  Call [phone] for bleeding >1 pad/hr?  Discharge - 05/23/20 21:22:00 CDT, Home?  Discharge Activity - Activity as Tolerated, Pelvic rest, call [phone] or present to ER for bleeding >1 pad/hr?  Follow up  Keep scheduled appointment, on 05/26/2020  ????  You will be contacted on Monday with appointment time. If you have not received a call, please contact our [phone]      aware/agree with above-Shannan